# Patient Record
Sex: FEMALE | Race: WHITE | NOT HISPANIC OR LATINO | URBAN - METROPOLITAN AREA
[De-identification: names, ages, dates, MRNs, and addresses within clinical notes are randomized per-mention and may not be internally consistent; named-entity substitution may affect disease eponyms.]

---
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
## 2025-01-07 ENCOUNTER — OFFICE VISIT (OUTPATIENT)
Dept: PEDIATRICS | Facility: CLINIC | Age: 20
End: 2025-01-07
Payer: COMMERCIAL

## 2025-01-07 ENCOUNTER — HOSPITAL ENCOUNTER (OUTPATIENT)
Dept: RADIOLOGY | Facility: CLINIC | Age: 20
Discharge: HOME | End: 2025-01-07
Payer: COMMERCIAL

## 2025-01-07 VITALS — WEIGHT: 112.9 LBS

## 2025-01-07 DIAGNOSIS — R07.81 RIB PAIN ON RIGHT SIDE: Primary | ICD-10-CM

## 2025-01-07 DIAGNOSIS — R07.81 RIB PAIN ON RIGHT SIDE: ICD-10-CM

## 2025-01-07 PROBLEM — R10.2 PELVIC PAIN: Status: ACTIVE | Noted: 2025-01-07

## 2025-01-07 PROCEDURE — 71100 X-RAY EXAM RIBS UNI 2 VIEWS: CPT | Mod: RT

## 2025-01-07 PROCEDURE — 71100 X-RAY EXAM RIBS UNI 2 VIEWS: CPT | Mod: RIGHT SIDE | Performed by: RADIOLOGY

## 2025-01-07 PROCEDURE — 99214 OFFICE O/P EST MOD 30 MIN: CPT | Performed by: PEDIATRICS

## 2025-01-07 RX ORDER — NITROFURANTOIN 25; 75 MG/1; MG/1
CAPSULE ORAL
COMMUNITY
Start: 2024-11-24

## 2025-01-07 NOTE — PROGRESS NOTES
Subjective   Patient ID: Cathleen Lai is a 19 y.o. female who presents for Flank Pain.  Today she is accompanied by alone.     HPI    Jumped into a bush on Saturday  While playing in the snow Saturday night  Pt jumped off a porch into what she thought was a pile of snow  However there was a bush under the snow  I branch went into her left side/flank  Porch was maybe 3 feet off the ground?  Immediately felt pain  Pain with moving since then  No bruising she can see  Taking a deep breath is painful  Taking ibuprofen since then without help    Review of systems negative unless otherwise indicated in HPI    Objective   Wt 51.2 kg (112 lb 14.4 oz)  pulse ox 100%.  HR 83    Physical Exam  General: alert, active, in no acute distress  Hydration: well-hydrated, mucous membranes moist, good skin turgor  Lungs: clear to auscultation, no wheezing, crackles or rhonchi, breathing unlabored. Normal inspection of right side of chest.  No bruising or swelling.  No flailing with deep breaths.  Reproducible pain on palpation right flank.  Pain seems isolated to 9th and 10th lower ribs on right only  Heart: Normal PMI. regular rate and rhythm, normal S1, S2, no murmurs or gallops.       Assessment/Plan   Problem List Items Addressed This Visit    None  Visit Diagnoses       Rib pain on right side    -  Primary    Relevant Orders    XR ribs right 2 views          Isolated rib pain s/p fall- normal exam of lungs  Rib Xray   Ibuprofen and rest    [phone]    Kristel Ellis MD

## 2025-04-11 ENCOUNTER — OFFICE VISIT (OUTPATIENT)
Dept: PEDIATRICS | Facility: CLINIC | Age: 20
End: 2025-04-11
Payer: COMMERCIAL

## 2025-04-11 ENCOUNTER — LAB REQUISITION (OUTPATIENT)
Dept: LAB | Facility: HOSPITAL | Age: 20
End: 2025-04-11

## 2025-04-11 VITALS — TEMPERATURE: 98 F | WEIGHT: 112.1 LBS

## 2025-04-11 DIAGNOSIS — R59.0 POSTERIOR CERVICAL LYMPHADENOPATHY: Primary | ICD-10-CM

## 2025-04-11 DIAGNOSIS — R59.0 LOCALIZED ENLARGED LYMPH NODES: ICD-10-CM

## 2025-04-11 DIAGNOSIS — H93.12 TINNITUS AURIUM, LEFT: ICD-10-CM

## 2025-04-11 LAB
BASOPHILS # BLD AUTO: 0.05 X10*3/UL (ref 0–0.1)
BASOPHILS NFR BLD AUTO: 0.6 %
EOSINOPHIL # BLD AUTO: 0.13 X10*3/UL (ref 0–0.7)
EOSINOPHIL NFR BLD AUTO: 1.6 %
ERYTHROCYTE [DISTWIDTH] IN BLOOD BY AUTOMATED COUNT: 13.4 % (ref 11.5–14.5)
HCT VFR BLD AUTO: 36.2 % (ref 36–46)
HETEROPH AB SERPLBLD QL IA.RAPID: NEGATIVE
HGB BLD-MCNC: 11.4 G/DL (ref 12–16)
IMM GRANULOCYTES # BLD AUTO: 0.03 X10*3/UL (ref 0–0.7)
IMM GRANULOCYTES NFR BLD AUTO: 0.4 % (ref 0–0.9)
LYMPHOCYTES # BLD AUTO: 2 X10*3/UL (ref 1.2–4.8)
LYMPHOCYTES NFR BLD AUTO: 24.2 %
MCH RBC QN AUTO: 28.6 PG (ref 26–34)
MCHC RBC AUTO-ENTMCNC: 31.5 G/DL (ref 32–36)
MCV RBC AUTO: 91 FL (ref 80–100)
MONOCYTES # BLD AUTO: 0.87 X10*3/UL (ref 0.1–1)
MONOCYTES NFR BLD AUTO: 10.5 %
NEUTROPHILS # BLD AUTO: 5.17 X10*3/UL (ref 1.2–7.7)
NEUTROPHILS NFR BLD AUTO: 62.7 %
NRBC BLD-RTO: 0 /100 WBCS (ref 0–0)
PLATELET # BLD AUTO: 484 X10*3/UL (ref 150–450)
RBC # BLD AUTO: 3.98 X10*6/UL (ref 4–5.2)
WBC # BLD AUTO: 8.3 X10*3/UL (ref 4.4–11.3)

## 2025-04-11 PROCEDURE — 99214 OFFICE O/P EST MOD 30 MIN: CPT | Performed by: STUDENT IN AN ORGANIZED HEALTH CARE EDUCATION/TRAINING PROGRAM

## 2025-04-11 PROCEDURE — 86308 HETEROPHILE ANTIBODY SCREEN: CPT

## 2025-04-11 PROCEDURE — 85025 COMPLETE CBC W/AUTO DIFF WBC: CPT

## 2025-04-11 NOTE — PROGRESS NOTES
Subjective   Patient ID: Cathleen Lai is a 19 y.o. female who presents for LYMP NOTES.  HPI    Left side of neck has been hurting for a month  Little lump  Now its getting worse    Left ear where the ln's are in AM feels like its ringing    No symptoms otherwise  No fever    No travel  4 dogs      ROS: All other systems reviewed and are negative.    Objective     Temp 36.7 °C (98 °F)   Wt 50.8 kg (112 lb 1.6 oz)     General:   alert and oriented, in no acute distress   Skin:   normal   Nose:   No congestion   Eyes:   sclerae white, pupils equal and reactive   Ears:   normal bilaterally   Mouth:   Moist mucous membranes, pharynx nonerythematous   Lungs:   clear to auscultation bilaterally   Heart:   regular rate and rhythm, S1, S2 normal, no murmur, click, rub or gallop   Abdomen:  Soft, non-tender, non-distended   Neck: Small mobile tender LN's palpable in left posterior cervical chain, with largest LN about 1-1.5 cm in diameter, no overlying erythema of skin       Assessment/Plan   Problem List Items Addressed This Visit    None  Visit Diagnoses         Codes    Posterior cervical lymphadenopathy    -  Primary R59.0    Relevant Orders    CBC and Auto Differential    Marj-Barr Virus Antibody Panel (VCA IgG/IgM, EA IgG, NA IgG)    Mononucleosis Screen    Tinnitus aurium, left     H93.12          Left posterior cervical LAD for 1 month with worsening discomfort, also with left sided tinnitus   - start by checking labs: CBCd, EBV, monospot  - next steps pending results but may consider course of antibiotics for possible lymphadenitis +/- ultrasound neck  - if tinnitus persists will refer to ENT       UPDATE 4/12  - cbc nonspecific, monospot negative (ebv titers not resulted).  - talked with Cathleen, having a shooting pain up side of head behind ear  - discussed options  - agreed to give it until Wednesday and if not resolved then call to schedule appointment to be reevaluated in person - if worsening call  sooner    Maria Antonia Guy MD

## 2025-04-14 ENCOUNTER — OFFICE VISIT (OUTPATIENT)
Dept: PEDIATRICS | Facility: CLINIC | Age: 20
End: 2025-04-14
Payer: COMMERCIAL

## 2025-04-14 VITALS — TEMPERATURE: 98.2 F | WEIGHT: 113.7 LBS

## 2025-04-14 DIAGNOSIS — I88.9 LYMPHADENITIS: Primary | ICD-10-CM

## 2025-04-14 LAB
EBV NA IGG SER IA-ACNC: >600 U/ML
EBV VCA IGG SER IA-ACNC: 102 U/ML
EBV VCA IGM SER IA-ACNC: <36 U/ML
POC STREP A RESULT: NEGATIVE
QUEST EBV PANEL INTERPRETATION:: ABNORMAL

## 2025-04-14 PROCEDURE — 99214 OFFICE O/P EST MOD 30 MIN: CPT | Performed by: PEDIATRICS

## 2025-04-14 PROCEDURE — 87651 STREP A DNA AMP PROBE: CPT | Performed by: PEDIATRICS

## 2025-04-14 PROCEDURE — 1036F TOBACCO NON-USER: CPT | Performed by: PEDIATRICS

## 2025-04-14 RX ORDER — AMOXICILLIN AND CLAVULANATE POTASSIUM 875; 125 MG/1; MG/1
1 TABLET, FILM COATED ORAL 2 TIMES DAILY
Qty: 20 TABLET | Refills: 0 | Status: SHIPPED | OUTPATIENT
Start: 2025-04-14 | End: 2025-04-24

## 2025-04-14 NOTE — PROGRESS NOTES
Subjective   Patient ID: Cathleen Lai is a 19 y.o. female who presents for Fever and Neck Pain.  Today she is accompanied by accompanied by mother.     HPI    Seen 4/11- see note    Came in because the side of her neck was hurting  Labs for mono negative last week  Feels swollen x 1 month  No documentation of labs result conversation    But MD asked if she had fever and at that time the answer was no  Temp to 102 over the weekend  Resolved  No sore throat  No cold  No cough  Pt tells me she suffers with allergy    Review of systems negative unless otherwise indicated in HPI    Objective   Temp 36.8 °C (98.2 °F)   Wt 51.6 kg (113 lb 11.2 oz)     Physical Exam  General: alert, active, in no acute distress  Hydration: well-hydrated, mucous membranes moist, good skin turgor  Eyes: conjunctiva clear  Ears: TM's normal, external auditory canals are clear   Nose: clear, no discharge  Throat: moist mucous membranes without erythema, exudates or petechiae, THICK post-nasal drainage seen  Neck: no lymphadenopathy- normal inspection of left side of neck- on palpation of left side of neck there is no focality I can appreciate.  I can feel her SCM and that is where pt endorses swelling/discomfort.  I cannot isolate a lymph node so I assume it is under muscle  Lungs: clear to auscultation, no wheezing, crackles or rhonchi, breathing unlabored  Heart: Normal PMI. regular rate and rhythm, normal S1, S2, no murmurs or gallops.     Assessment/Plan   Problem List Items Addressed This Visit    None  Visit Diagnoses       Lymphadenitis    -  Primary    Relevant Medications    amoxicillin-pot clavulanate (Augmentin) 875-125 mg tablet    Other Relevant Orders    POCT NOW STREP A manually resulted          Pt reports swelling/discomfort on left side of neck.  Recent labs reviewed in the office today are normal.  Strep testing in the office is negative.  Exam is reassuring.  The most notable aspect of exam is thick PND.  Speculate it is  possible pt has a LN under SCM that is inflamed?  Possibly sinusitis?  Plan to treat with Augmentin x 10 days  If sxs do not improve will pursue ultrasound of area in question  Pt has been instructed to reach out next week with an update    Kristel Ellis MD